# Patient Record
Sex: MALE | Race: WHITE | NOT HISPANIC OR LATINO | Employment: OTHER | URBAN - METROPOLITAN AREA
[De-identification: names, ages, dates, MRNs, and addresses within clinical notes are randomized per-mention and may not be internally consistent; named-entity substitution may affect disease eponyms.]

---

## 2018-02-15 PROBLEM — J18.9 PNEUMONIA OF BOTH LOWER LOBES DUE TO INFECTIOUS ORGANISM: Status: ACTIVE | Noted: 2018-02-15

## 2018-02-15 PROBLEM — R05.8 PRODUCTIVE COUGH: Status: ACTIVE | Noted: 2018-02-15

## 2018-02-17 PROBLEM — R55 SYNCOPE: Status: ACTIVE | Noted: 2018-02-17

## 2018-02-18 PROBLEM — I10 HYPERTENSION: Status: ACTIVE | Noted: 2018-02-18

## 2018-02-18 PROBLEM — R09.1 PLEURITIS: Status: ACTIVE | Noted: 2018-02-18

## 2018-02-18 PROBLEM — J32.9 SINUSITIS: Status: ACTIVE | Noted: 2018-02-18

## 2018-02-18 PROBLEM — E05.90 HYPERTHYROIDISM: Status: ACTIVE | Noted: 2018-02-18

## 2018-02-19 PROBLEM — K59.00 CONSTIPATION: Status: ACTIVE | Noted: 2018-02-19

## 2019-03-06 ENCOUNTER — OFFICE VISIT (OUTPATIENT)
Dept: URGENT CARE | Facility: CLINIC | Age: 59
End: 2019-03-06
Payer: COMMERCIAL

## 2019-03-06 VITALS
HEIGHT: 75 IN | SYSTOLIC BLOOD PRESSURE: 137 MMHG | BODY MASS INDEX: 37.3 KG/M2 | RESPIRATION RATE: 20 BRPM | OXYGEN SATURATION: 98 % | DIASTOLIC BLOOD PRESSURE: 89 MMHG | TEMPERATURE: 99 F | HEART RATE: 82 BPM | WEIGHT: 300 LBS

## 2019-03-06 DIAGNOSIS — R10.12 LEFT UPPER QUADRANT PAIN: Primary | ICD-10-CM

## 2019-03-06 DIAGNOSIS — M62.08 DIASTASIS RECTI: ICD-10-CM

## 2019-03-06 PROCEDURE — 3008F BODY MASS INDEX DOCD: CPT | Mod: CPTII,S$GLB,, | Performed by: PHYSICIAN ASSISTANT

## 2019-03-06 PROCEDURE — 99203 PR OFFICE/OUTPT VISIT, NEW, LEVL III, 30-44 MIN: ICD-10-PCS | Mod: S$GLB,,, | Performed by: PHYSICIAN ASSISTANT

## 2019-03-06 PROCEDURE — 3008F PR BODY MASS INDEX (BMI) DOCUMENTED: ICD-10-PCS | Mod: CPTII,S$GLB,, | Performed by: PHYSICIAN ASSISTANT

## 2019-03-06 PROCEDURE — 99203 OFFICE O/P NEW LOW 30 MIN: CPT | Mod: S$GLB,,, | Performed by: PHYSICIAN ASSISTANT

## 2019-03-06 NOTE — PROGRESS NOTES
"Subjective:       Patient ID: Buck Manrique is a 58 y.o. male.    Vitals:  height is 6' 3" (1.905 m) and weight is 136.1 kg (300 lb). His oral temperature is 98.7 °F (37.1 °C). His blood pressure is 137/89 and his pulse is 82. His respiration is 20 and oxygen saturation is 98%.     Chief Complaint: Breast Mass    Pain/discomfort/protrusion in LUQ since episode of pneumonia with heavy coughing approximately 1 year ago      Abdominal Pain   The current episode started more than 1 year ago. The problem occurs intermittently. The problem has been waxing and waning. The pain is located in the LUQ. The pain is at a severity of 5/10. The quality of the pain is aching. Pertinent negatives include no anorexia, constipation, diarrhea, dysuria, fever, frequency, hematochezia, hematuria, melena, nausea, vomiting or weight loss. The pain is aggravated by certain positions and coughing. The pain is relieved by certain positions.       Constitution: Negative for fever.   Neck: Negative for painful lymph nodes.   Cardiovascular: Negative for leg swelling.   Eyes: Negative for double vision and blurred vision.   Respiratory: Negative for shortness of breath.    Gastrointestinal: Positive for abdominal pain. Negative for nausea, vomiting, constipation, diarrhea and bright red blood in stool.   Genitourinary: Negative for dysuria, frequency, urgency and hematuria.   Musculoskeletal: Negative for muscle cramps.   Skin: Negative for color change and pale.   Allergic/Immunologic: Negative for seasonal allergies.   Neurological: Negative for dizziness, light-headedness and passing out.   Hematologic/Lymphatic: Negative for swollen lymph nodes, easy bruising/bleeding and history of blood clots. Does not bruise/bleed easily.   Psychiatric/Behavioral: Negative for nervous/anxious, sleep disturbance and depression. The patient is not nervous/anxious.        Objective:      Physical Exam   Constitutional: He is oriented to person, place, and " time. He appears well-developed and well-nourished.   HENT:   Head: Normocephalic and atraumatic.   Right Ear: External ear normal.   Left Ear: External ear normal.   Nose: Nose normal.   Mouth/Throat: Mucous membranes are normal.   Eyes: Conjunctivae and lids are normal.   Neck: Trachea normal and full passive range of motion without pain. Neck supple.   Cardiovascular: Normal rate, regular rhythm and normal heart sounds.   Pulmonary/Chest: Effort normal and breath sounds normal. No respiratory distress.   Abdominal: Soft. Normal appearance and bowel sounds are normal. He exhibits no shifting dullness, no distension, no abdominal bruit, no ascites, no pulsatile midline mass and no mass. There is no tenderness. There is no rigidity and no guarding.       Musculoskeletal: Normal range of motion. He exhibits no edema.   Neurological: He is alert and oriented to person, place, and time. He has normal strength.   Skin: Skin is warm, dry and intact. He is not diaphoretic. No pallor.   Psychiatric: He has a normal mood and affect. His speech is normal and behavior is normal. Judgment and thought content normal. Cognition and memory are normal.   Nursing note and vitals reviewed.      Assessment:       1. Left upper quadrant pain    2. Diastasis recti        Plan:       - Will discuss U/S results with patient when available, and possible gen surg referral. Warning signs and symptoms discussed that might warrant an ED visit. Pt v/u all education and instruction. Discharged in stable condition.    Left upper quadrant pain  -     US Abdomen Complete; Future; Expected date: 03/06/2019    Diastasis recti  -     US Abdomen Complete; Future; Expected date: 03/06/2019      Patient Instructions   · Follow up with your primary care in 2-5 days if symptoms have not improved, or you may return here.  · If you were referred to a specialist, please follow up with that specialty.  · If you were prescribed antibiotics, please take them to  completion.  · If you were prescribed a narcotic or any medication with sedative effects, do not drive or operate heavy equipment or machinery while taking these medications.  · You must understand that you have received treatment at an Urgent Care facility only, and that you may be released before all of your medical problems are known or treated. Urgent Care facilities are not equipped to handle life threatening emergencies. It is recommended that you go to an Emergency Department for further evaluation of worsening or concerning symptoms, or possibly life threatening conditions as discussed.                                        If you  smoke, please stop smoking

## 2019-03-08 ENCOUNTER — TELEPHONE (OUTPATIENT)
Dept: URGENT CARE | Facility: CLINIC | Age: 59
End: 2019-03-08

## 2019-05-22 ENCOUNTER — TELEPHONE (OUTPATIENT)
Dept: CARDIOTHORACIC SURGERY | Facility: CLINIC | Age: 59
End: 2019-05-22

## 2019-05-29 ENCOUNTER — TELEPHONE (OUTPATIENT)
Dept: CARDIOTHORACIC SURGERY | Facility: CLINIC | Age: 59
End: 2019-05-29

## 2019-05-29 NOTE — TELEPHONE ENCOUNTER
Left message for patient stating that images were received.    ----- Message from Meron Aranda sent at 5/29/2019  9:42 AM CDT -----  Contact: Pt.Self   Needs Advice    Reason for call:  Pt.states he would like to speak with nurse in reference to finding out if images were received by 's office         Communication Preference:  [phone] (please leave voicemail with nature of call)     Additional Information:    Thank You :)

## 2019-05-31 ENCOUNTER — OFFICE VISIT (OUTPATIENT)
Dept: CARDIOTHORACIC SURGERY | Facility: CLINIC | Age: 59
End: 2019-05-31
Payer: COMMERCIAL

## 2019-05-31 VITALS
HEIGHT: 75 IN | HEART RATE: 76 BPM | SYSTOLIC BLOOD PRESSURE: 133 MMHG | BODY MASS INDEX: 38.26 KG/M2 | OXYGEN SATURATION: 96 % | DIASTOLIC BLOOD PRESSURE: 84 MMHG | WEIGHT: 307.75 LBS

## 2019-05-31 DIAGNOSIS — S22.32XA CLOSED FRACTURE OF ONE RIB OF LEFT SIDE, INITIAL ENCOUNTER: ICD-10-CM

## 2019-05-31 DIAGNOSIS — E66.01 CLASS 3 SEVERE OBESITY DUE TO EXCESS CALORIES WITHOUT SERIOUS COMORBIDITY IN ADULT, UNSPECIFIED BMI: Primary | ICD-10-CM

## 2019-05-31 PROCEDURE — 3008F PR BODY MASS INDEX (BMI) DOCUMENTED: ICD-10-PCS | Mod: CPTII,S$GLB,, | Performed by: THORACIC SURGERY (CARDIOTHORACIC VASCULAR SURGERY)

## 2019-05-31 PROCEDURE — 99999 PR PBB SHADOW E&M-EST. PATIENT-LVL III: CPT | Mod: PBBFAC,,, | Performed by: THORACIC SURGERY (CARDIOTHORACIC VASCULAR SURGERY)

## 2019-05-31 PROCEDURE — 99204 PR OFFICE/OUTPT VISIT, NEW, LEVL IV, 45-59 MIN: ICD-10-PCS | Mod: S$GLB,,, | Performed by: THORACIC SURGERY (CARDIOTHORACIC VASCULAR SURGERY)

## 2019-05-31 PROCEDURE — 99204 OFFICE O/P NEW MOD 45 MIN: CPT | Mod: S$GLB,,, | Performed by: THORACIC SURGERY (CARDIOTHORACIC VASCULAR SURGERY)

## 2019-05-31 PROCEDURE — 99999 PR PBB SHADOW E&M-EST. PATIENT-LVL III: ICD-10-PCS | Mod: PBBFAC,,, | Performed by: THORACIC SURGERY (CARDIOTHORACIC VASCULAR SURGERY)

## 2019-05-31 PROCEDURE — 3008F BODY MASS INDEX DOCD: CPT | Mod: CPTII,S$GLB,, | Performed by: THORACIC SURGERY (CARDIOTHORACIC VASCULAR SURGERY)

## 2019-05-31 RX ORDER — ASPIRIN 325 MG
325 TABLET ORAL DAILY
COMMUNITY
End: 2021-12-22 | Stop reason: ALTCHOICE

## 2019-05-31 NOTE — PROGRESS NOTES
History & Physical    SUBJECTIVE:     History of Present Illness:   58 y.o. 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 Patient reports in February 2018 he had pneumonia and/or bronchitis requiring hospital admission. States he was coughing so forcefully he had several near syncopal episodes. Endorses a fall during syncopal episode but states he feel softly onto his bottom. During one coughing episode he felt an acute sharp pain in his left side. He assumed there was a pulled muscle but rib fracture was identified during hospital stay. Initially he did well but over the past 3-4 months pain has increased. Pain in sharp, shooting and worse in the morning. He wears an abdominal binder at work which does help with splinting. Denies clicking or popping. 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 He also reports LUQ pain and swelling. Recently seen in urgent care for persistent pain and chest CT was ordered. Identified a left 7th posterolateral rib fracture with evidence of nonunion.     Remote smoking history. Former heavy beer drinker but recently quit all alcohol in an effort to lose weight.   PSH: appendectomy, left foot reconstruction after injury     Chief Complaint   Patient presents with    Consult       Review of patient's allergies indicates:  No Known Allergies    Current Outpatient Medications   Medication Sig Dispense Refill    aspirin 325 MG tablet Take 325 mg by mouth once daily.      methIMAzole (TAPAZOLE) 10 MG Tab Take 10 mg by mouth once daily.       multivitamin capsule Take 1 capsule by mouth once daily.      olmesartan-hydrochlorothiazide (BENICAR HCT) 40-12.5 mg Tab Take 1 tablet by mouth once daily.      albuterol 90 mcg/actuation inhaler Inhale 1-2 puffs into the lungs every 6 (six) hours as needed for Wheezing. Rescue 1 Inhaler 0     No current facility-administered  "medications for this visit.        Past Medical History:   Diagnosis Date    Hypertension     Thyroid disease     HYPERTHYROID     Past Surgical History:   Procedure Laterality Date    APPENDECTOMY      LEFT FOOT SURGERY       Family History   Problem Relation Age of Onset    Diabetes Father     Hyperthyroidism Father      Social History     Tobacco Use    Smoking status: Former Smoker   Substance Use Topics    Alcohol use: No     Alcohol/week: 1.2 oz     Types: 2 Cans of beer per week     Frequency: Never    Drug use: No        Review of Systems:  Review of Systems   Constitutional: Negative for activity change, fatigue and fever.   HENT: Negative for congestion and trouble swallowing.    Eyes: Negative for visual disturbance.   Respiratory: Negative for cough, chest tightness, shortness of breath and wheezing.    Cardiovascular: Negative for chest pain, palpitations and leg swelling.   Gastrointestinal: Positive for abdominal distention and abdominal pain (LUQ). Negative for diarrhea, nausea and vomiting.   Genitourinary: Negative for difficulty urinating.   Musculoskeletal: Positive for arthralgias (Left chest). Negative for gait problem.   Neurological: Negative for weakness, light-headedness and headaches.   Psychiatric/Behavioral: Negative for confusion.       OBJECTIVE:     Vital Signs (Most Recent)  Vitals:    05/31/19 0912   BP: 133/84   Pulse: 76   SpO2: 96%   Weight: (!) 139.6 kg (307 lb 12.2 oz)   Height: 6' 3" (1.905 m)   PainSc: 0-No pain   Body mass index is 38.47 kg/m².    Physical Exam:  Physical Exam   Constitutional: He is oriented to person, place, and time. He appears well-developed and well-nourished.   Obese   HENT:   Head: Normocephalic.   Eyes: Pupils are equal, round, and reactive to light.   Neck: Normal range of motion. Neck supple. No tracheal deviation present.   Cardiovascular: Normal rate, regular rhythm, normal heart sounds and intact distal pulses.   Pulmonary/Chest: Effort " normal and breath sounds normal. He has no wheezes. He exhibits tenderness.   Left chest wall tenderness. No clicking on palpation. Difficult to identify rib spaces due to subcutaneous tissues.   Abdominal: Soft. Bowel sounds are normal. He exhibits no distension. There is no tenderness. A hernia (Umbilical hernia. No obvious LUQ herniation) is present.   Non tender, reducible umbilical hernia without discoloration.  Multiple venous varicosities across abdominal wall, abdominal diastasis   Musculoskeletal: He exhibits no edema.   Neurological: He is alert and oriented to person, place, and time.   Skin: Skin is warm.   Psychiatric: He has a normal mood and affect.       Chest and Abdomen CT 5/14/19:   Thyroid is unchanged with heterogeneous nodule at its inferior aspect containing dense internal calcification and measuring 2.9 cm. Lack of intravenous contrast limits evaluation of the vasculature and remaining mediastinal structures. Unenhanced aorta normal in caliber. No pulmonary artery enlargement. No cardiac chamber enlargement. No pericardial or pleural effusion. No adenopathy. No consolidation. No central endobronchial lesions. Old fracture left 7th rib laterally with evidence of nonunion. Findings within the imaged upper abdomen are reported on concomitant performed dedicated CT of the abdomen and pelvis. Nonobstructing calyceal calculus right kidney. Hepatic steatosis.    ASSESSMENT/PLAN:      58 y.o. 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  I suspect that there may be two different process at play here-an old rib fracture and possibly an upper abdominal wall muscle strain.      PLAN:Plan   Long discussion with patient and wife regarding options for surgical and non-surgical management of left rib fracture. We  discussed rib plating today.  There is a callus at the site of the left 5th rib fracture with no significant rib displacement.   I expressed  concerns about the patient's obesity and very protuberant abdomen.  If he has any left costal margin muscle strain or hernia, his large abdomen will negatively impact a successful repair. I will consider surgical intervention only after the patient has lost weight.  He drinks 1-2 cases of beer/ week and eats one large meal right before going to bed.  I discussed lifestyle and dietary changes with the patient and his wife and suggested that such changes may positively impact his symptom severity and increase the likelihood of a successful outcome if surgery is performed. I recommend no heavy lifting greater than 40 pounds at work for 8 weeks. Will repeat chest CT in 3 months to assess rib healing, left subcostal margin and epigastric discomfort and his progress with weight loss.       I also advised the patient to seek urgent medical attention if he were to develop periumbilical pain with or without redness or dark blue color

## 2019-05-31 NOTE — LETTER
May 31, 2019    Buck Manrique  [address] [facility] [address]         [facility]  [address]  [place] [address]  Phone: [phone]  Fax: [phone] May 31, 2019     Patient: Buck Manrique   YOB: 1960   Date of Visit: 5/31/2019

## 2019-05-31 NOTE — LETTER
[facility]  [address]  [place] [address]  Phone: [phone]  Fax: [phone] May 31, 2019      Oneal Montejo MD  [address] [place] [id number]    Patient: Buck Manrique   MR Number: [id number]   YOB: 1960   Date of Visit: 5/31/2019       Dear Dr. Montejo:    Thank you for referring Buck Manrique to me for evaluation. Mr. Manrique presents for evaluation of a left-sided rib fracture and possible costal margin herniation with epigastric pain.      I reviewed the chest CT images which reveal a left seventh rib fracture with callus formation, incomplete union and no rib displacement.  I do not see any obvious chest wall or upper abdomen deformity.  He does have an abdominal diastasis and an umbilical hernia as well as a markedly protuberant abdomen with prominent chest wall venous collaterals.    I do not recommend any surgical intervention until Mr. Manrique loses weight.  I will then reevaluate him to see if there is a need for rib plating.  I suspect that he may have two different processes-an old rib fracture and a possible abdominal strain.  I will see him in follow up in 3 months.     Sincerely,    Alfonso Allen MD    BP/etb    CC  Tonja Merino MD    [facility]

## 2019-07-02 DIAGNOSIS — E66.01 CLASS 3 SEVERE OBESITY DUE TO EXCESS CALORIES WITHOUT SERIOUS COMORBIDITY IN ADULT, UNSPECIFIED BMI: Primary | ICD-10-CM

## 2019-09-27 ENCOUNTER — OFFICE VISIT (OUTPATIENT)
Dept: URGENT CARE | Facility: CLINIC | Age: 59
End: 2019-09-27
Payer: COMMERCIAL

## 2019-09-27 VITALS
TEMPERATURE: 99 F | BODY MASS INDEX: 38.17 KG/M2 | WEIGHT: 307 LBS | HEIGHT: 75 IN | HEART RATE: 79 BPM | OXYGEN SATURATION: 96 % | DIASTOLIC BLOOD PRESSURE: 70 MMHG | SYSTOLIC BLOOD PRESSURE: 140 MMHG

## 2019-09-27 DIAGNOSIS — H11.31 SUBCONJUNCTIVAL HEMORRHAGE OF RIGHT EYE: Primary | ICD-10-CM

## 2019-09-27 PROCEDURE — 99214 PR OFFICE/OUTPT VISIT, EST, LEVL IV, 30-39 MIN: ICD-10-PCS | Mod: S$GLB,,, | Performed by: FAMILY MEDICINE

## 2019-09-27 PROCEDURE — 99214 OFFICE O/P EST MOD 30 MIN: CPT | Mod: S$GLB,,, | Performed by: FAMILY MEDICINE

## 2019-09-27 PROCEDURE — 3008F PR BODY MASS INDEX (BMI) DOCUMENTED: ICD-10-PCS | Mod: CPTII,S$GLB,, | Performed by: FAMILY MEDICINE

## 2019-09-27 PROCEDURE — 3008F BODY MASS INDEX DOCD: CPT | Mod: CPTII,S$GLB,, | Performed by: FAMILY MEDICINE

## 2019-09-27 NOTE — LETTER
September 27, 2019  Buck Manrique  [address]  [place] [address]                [facility] [place]  [address], [address]  [place] [address]  Phone: [phone]  Fax: [phone] Buck Manrique was seen and treated in our Urgent Care   department on 9/27/2019. He may return to work in 2 - 3 days.      If you have any questions or concerns, please don't hesitate to call.    Sincerely,        Ovi Devries MD

## 2019-09-28 ENCOUNTER — TELEPHONE (OUTPATIENT)
Dept: URGENT CARE | Facility: CLINIC | Age: 59
End: 2019-09-28

## 2019-09-28 NOTE — PROGRESS NOTES
"Subjective:       Patient ID: Buck Manrique is a 59 y.o. male.    Vitals:  height is 6' 3" (1.905 m) and weight is 139.3 kg (307 lb) (abnormal). His tympanic temperature is 98.6 °F (37 °C). His blood pressure is 140/70 (abnormal) and his pulse is 79. His oxygen saturation is 96%.     Chief Complaint: Eye Problem    Eye Problem    The right eye is affected. This is a new problem. The current episode started today. The problem occurs constantly. The problem has been gradually worsening. The injury mechanism is unknown. The patient is experiencing no pain. There is no known exposure to pink eye. He wears contacts. Associated symptoms include blurred vision, eye redness and itching. Pertinent negatives include no eye discharge, double vision, fever, foreign body sensation or photophobia. He has tried nothing for the symptoms.       Constitution: Negative for chills and fever.   HENT: Negative for congestion and sinus pain.    Eyes: Positive for eye itching, eye redness and blurred vision. Negative for eye trauma, foreign body in eye, eye discharge, eye pain, photophobia, vision loss, double vision and eyelid swelling.   Allergic/Immunologic: Negative for seasonal allergies and itching.       Objective:      Physical Exam   Constitutional: He is oriented to person, place, and time. He appears well-developed and well-nourished. He is cooperative.  Non-toxic appearance. He does not appear ill. No distress.   HENT:   Head: Normocephalic and atraumatic.   Right Ear: Hearing, tympanic membrane, external ear and ear canal normal.   Left Ear: Hearing, tympanic membrane, external ear and ear canal normal.   Nose: Nose normal. No mucosal edema, rhinorrhea or nasal deformity. No epistaxis. Right sinus exhibits no maxillary sinus tenderness and no frontal sinus tenderness. Left sinus exhibits no maxillary sinus tenderness and no frontal sinus tenderness.   Mouth/Throat: Uvula is midline, oropharynx is clear and moist and mucous " membranes are normal. No trismus in the jaw. Normal dentition. No uvula swelling. No posterior oropharyngeal erythema.   Eyes: Conjunctivae and lids are normal. Right eye exhibits no discharge. Left eye exhibits no discharge. No scleral icterus.   Fundoscopic exam:       The right eye shows hemorrhage.       Sclera clear bilat   Neck: Trachea normal, normal range of motion, full passive range of motion without pain and phonation normal. Neck supple.   Cardiovascular: Normal rate, regular rhythm, normal heart sounds, intact distal pulses and normal pulses.   Pulmonary/Chest: Effort normal and breath sounds normal. No respiratory distress.   Abdominal: Soft. Normal appearance and bowel sounds are normal. He exhibits no distension, no pulsatile midline mass and no mass. There is no tenderness.   Musculoskeletal: Normal range of motion. He exhibits no edema or deformity.   Neurological: He is alert and oriented to person, place, and time. He exhibits normal muscle tone. Coordination normal.   Skin: Skin is warm, dry and intact. He is not diaphoretic. No pallor.   Psychiatric: He has a normal mood and affect. His speech is normal and behavior is normal. Judgment and thought content normal. Cognition and memory are normal.   Nursing note and vitals reviewed.      Assessment:       1. Subconjunctival hemorrhage of right eye        Plan:         Subconjunctival hemorrhage of right eye  -     tobramycin-dexamethasone (TOBRADEX ST) 0.3-0.05 % DrpS; Apply 1 drop to eye 3 (three) times daily. for 10 days  Dispense: 5 mL; Refill: 0    Please drink plenty of fluids.  Please get plenty of rest.  Please return here or go to the Emergency Department for any concerns or worsening of condition.  If you were given wait & see antibiotics, please wait 3-5 days before taking them, and only take them if your symptoms have worsened or not improved.  If you do begin taking the antibiotics, please take them to completion.  If you were  prescribed antibiotics, please take them to completion.  If you were prescribed a narcotic medication, do not drive or operate heavy equipment or machinery while taking these medications.      If not allergic, please take over the counter Tylenol (Acetaminophen) and/or Motrin (Ibuprofen) as directed for control of pain and/or fever.    Please follow up with your primary care doctor or specialist as needed.  Tonja Merino MD  [phone]    [facility] - [facility]  [address] [place]  [address] [phone]    You must understand that you have received treatment at an Urgent Care facility only, and that you may be  released before all of your medical problems are known or treated. Urgent Care facilities are not equipped to  handle life threatening emergencies. It is recommended that you seek care at an Emergency Department for  further evaluation of worsening or concerning symptoms, or possibly life threatening conditions as  discussed.

## 2019-09-28 NOTE — PATIENT INSTRUCTIONS
Please drink plenty of fluids.  Please get plenty of rest.  Please return here or go to the Emergency Department for any concerns or worsening of condition.  If you were given wait & see antibiotics, please wait 3-5 days before taking them, and only take them if your symptoms have worsened or not improved.  If you do begin taking the antibiotics, please take them to completion.  If you were prescribed antibiotics, please take them to completion.  If you were prescribed a narcotic medication, do not drive or operate heavy equipment or machinery while taking these medications.      If not allergic, please take over the counter Tylenol (Acetaminophen) and/or Motrin (Ibuprofen) as directed for control of pain and/or fever.    Please follow up with your primary care doctor or specialist as needed.  Tonja Merino MD  [phone]    Ophthalmology - [facility]  [address] [place]  [address]  [phone]    You must understand that you have received treatment at an Urgent Care facility only, and that you may be  released before all of your medical problems are known or treated. Urgent Care facilities are not equipped to  handle life threatening emergencies. It is recommended that you seek care at an Emergency Department for  further evaluation of worsening or concerning symptoms, or possibly life threatening conditions as  discussed.      Subconjunctival Hemorrhage    A subconjunctival hemorrhage is a result of a broken blood vessel in the white portion of the eye. It is usually painless and may be caused by coughing, sneezing, or vomiting. An injury to the eye can cause this. It can also be a sign of hypertension (high blood pressure) or a bleeding disorder.  Although it can look frightening, the presence of the blood is not serious. The blood will be reabsorbed without treatment within 2 to 3 weeks.  Home care  You may continue your usual activities.  Follow-up care  Follow up with  your healthcare provider, or as advised.  When to seek medical advice  Contact your healthcare provider right away if any of these occur:  · Pain in the eye  · Change in vision  · The blood does not disappear within three weeks  · Increasing redness or swelling of the eye  · Severe headache or dizziness  · Signs of bruising or bleeding from other parts of your body  Date Last Reviewed: 6/14/2015  © 1262-5003 [employer]. [address] [place] [address], [place] [address]. All rights reserved. This information is not intended as a substitute for professional medical care. Always follow your healthcare professional's instructions.      Subconjunctival Hemorrhage    A subconjunctival hemorrhage is when a blood vessel breaks open in the white of the eye. It causes a bright red patch in the white of the eye. It is similar to a bruise on the skin. This type of hemorrhage is common. It can look quite alarming, but it is usually harmless.  Understanding the conjunctiva  The conjunctiva is the thin layer that covers the inside of the eyelids and the surface of the eye. It has many tiny blood vessels that bring oxygen and nutrients to the eye. The sclera is the white part of the eye that lies beneath the conjunctiva. Sometimes a blood vessel in the conjunctiva breaks open and bleeds. The blood then collects under the conjunctiva and turns part of the eye red. Over several weeks, your body then absorbs the blood.  What causes subconjunctival hemorrhage?  In many cases the cause isnt known. But some health conditions may make it more likely. These include:  · Eye injury  · Eye surgery  · High blood pressure  · Inflammation of the conjunctiva  · Contact lens use  · Diabetes  · Arteriosclerosis  · Tumor of the conjunctiva  · Diseases that affect blood clotting  · Violent sneezing, coughing, or vomiting  · Certain medicines that can increase bleeding, such as aspirin  · Pushing hard during childbirth  · Straining during  constipation  Symptoms of subconjunctival hemorrhage  The main symptom is a red patch on the eye. You may notice it after waking up in the morning. In most cases just one eye will have a hemorrhage. It usually happens once and then goes away. But some health conditions may cause repeat hemorrhages. You may feel like you have something in your eye, but this is not common. The hemorrhage shouldnt affect your eyesight or cause any pain. If you do have pain, you may have another type of problem with your eye.  Diagnosing subconjunctival hemorrhage  Your healthcare provider will ask about your health history. You may have a physical exam. This includes a basic eye exam. Your provider will make sure you dont have other causes of red eye that may need other treatment.  You will need to see an eye doctor (ophthalmologist) if you have had an eye injury. This doctor might use a special lighted microscope to look closely at your eye. This helps show the doctor if the injury hurt the eye itself and not just its outer layer.  If this is not your first subconjunctival hemorrhage, your doctor may need to find the cause. For example, you may need blood tests to check for a blood clotting disorder.  Treatment for subconjunctival hemorrhage  In most cases you will not need treatment. The red patch will usually go away on its own in a few weeks. It will turn from red to brown and then yellow. There are no treatments to speed up this process. Your doctor may suggest you use a warm compress and artificial tears eye drops to help relieve some of the redness.  If your subconjunctival hemorrhage was caused by a health condition, that condition will be treated. For example, you may need a blood pressure medicine to treat high blood pressure.  When to call your healthcare provider  Call your healthcare provider right away if you have any of these:  · Hemorrhage that doesnt go away in 2 to 3 weeks  · Eye pain  · Loss of eyesight  · Another  subconjunctival hemorrhage    Date Last Reviewed: 2/1/2017  © 4538-5186 [employer]. [address] [place] [address], [place] [address]. All rights reserved. This information is not intended as a substitute for professional medical care. Always follow your healthcare professional's instructions.

## 2020-08-20 DIAGNOSIS — U07.1 COVID-19 VIRUS DETECTED: ICD-10-CM

## 2021-02-27 PROBLEM — R06.09 DYSPNEA ON EXERTION: Status: ACTIVE | Noted: 2021-02-27

## 2021-05-04 ENCOUNTER — PATIENT MESSAGE (OUTPATIENT)
Dept: RESEARCH | Facility: HOSPITAL | Age: 61
End: 2021-05-04

## 2021-08-07 PROBLEM — Z86.16 HISTORY OF 2019 NOVEL CORONAVIRUS DISEASE (COVID-19): Status: ACTIVE | Noted: 2020-08-03

## 2021-08-07 PROBLEM — R73.03 PREDIABETES: Status: ACTIVE | Noted: 2020-11-30

## 2021-12-21 PROBLEM — J12.82 PNEUMONIA DUE TO COVID-19 VIRUS: Status: ACTIVE | Noted: 2018-02-15

## 2021-12-21 PROBLEM — U07.1 PNEUMONIA DUE TO COVID-19 VIRUS: Status: ACTIVE | Noted: 2018-02-15

## 2021-12-21 PROBLEM — R55 SYNCOPE: Status: RESOLVED | Noted: 2018-02-17 | Resolved: 2021-12-21

## 2021-12-21 PROBLEM — R09.1 PLEURITIS: Status: RESOLVED | Noted: 2018-02-18 | Resolved: 2021-12-21

## 2021-12-21 PROBLEM — J32.9 SINUSITIS: Status: RESOLVED | Noted: 2018-02-18 | Resolved: 2021-12-21

## 2021-12-21 PROBLEM — R05.8 PRODUCTIVE COUGH: Status: RESOLVED | Noted: 2018-02-15 | Resolved: 2021-12-21

## 2021-12-21 PROBLEM — R06.09 DYSPNEA ON EXERTION: Status: RESOLVED | Noted: 2021-02-27 | Resolved: 2021-12-21

## 2021-12-21 PROBLEM — J12.82 PNEUMONIA DUE TO COVID-19 VIRUS: Status: RESOLVED | Noted: 2018-02-15 | Resolved: 2021-12-21

## 2021-12-21 PROBLEM — U07.1 PNEUMONIA DUE TO COVID-19 VIRUS: Status: RESOLVED | Noted: 2018-02-15 | Resolved: 2021-12-21

## 2021-12-22 PROBLEM — F42.2 MIXED OBSESSIONAL THOUGHTS AND ACTS: Status: ACTIVE | Noted: 2021-12-22

## 2021-12-22 PROBLEM — Z87.81 HISTORY OF FRACTURE OF RIB: Status: ACTIVE | Noted: 2018-04-01

## 2022-01-01 PROBLEM — E11.9 TYPE 2 DIABETES MELLITUS WITHOUT COMPLICATION, WITHOUT LONG-TERM CURRENT USE OF INSULIN: Status: ACTIVE | Noted: 2021-12-27

## 2022-06-29 PROBLEM — Z87.81 HISTORY OF FRACTURE OF RIB: Status: RESOLVED | Noted: 2018-04-01 | Resolved: 2022-06-29

## 2022-06-29 PROBLEM — E88.810 METABOLIC SYNDROME: Status: ACTIVE | Noted: 2022-06-29

## 2022-06-29 PROBLEM — Z86.16 HISTORY OF 2019 NOVEL CORONAVIRUS DISEASE (COVID-19): Status: RESOLVED | Noted: 2020-08-03 | Resolved: 2022-06-29

## 2022-06-29 PROBLEM — R73.03 PREDIABETES: Status: RESOLVED | Noted: 2020-11-30 | Resolved: 2022-06-29

## 2022-06-29 PROBLEM — R79.89 LOW TESTOSTERONE IN MALE: Status: ACTIVE | Noted: 2022-06-29

## 2022-07-07 PROBLEM — K76.0 NAFL (NONALCOHOLIC FATTY LIVER): Status: ACTIVE | Noted: 2022-07-07

## 2022-09-30 PROBLEM — K21.01 GASTROESOPHAGEAL REFLUX DISEASE WITH ESOPHAGITIS AND HEMORRHAGE: Status: ACTIVE | Noted: 2022-09-30

## 2022-11-22 PROBLEM — M75.122 COMPLETE ROTATOR CUFF TEAR OF LEFT SHOULDER: Status: ACTIVE | Noted: 2022-11-22

## 2022-12-02 PROBLEM — Z86.19 HISTORY OF EPSTEIN-BARR VIRUS INFECTION: Status: ACTIVE | Noted: 2022-12-02

## 2022-12-30 PROBLEM — K21.00 GASTROESOPHAGEAL REFLUX DISEASE WITH ESOPHAGITIS WITHOUT HEMORRHAGE: Status: ACTIVE | Noted: 2022-09-30

## 2022-12-30 PROBLEM — I87.2 VENOUS INSUFFICIENCY OF BOTH LOWER EXTREMITIES: Status: ACTIVE | Noted: 2022-12-30

## 2023-03-19 PROBLEM — R93.1 AGATSTON CORONARY ARTERY CALCIUM SCORE BETWEEN 100 AND 199: Status: ACTIVE | Noted: 2023-03-19

## 2023-04-05 PROBLEM — Z98.890 S/P ARTHROSCOPY OF LEFT SHOULDER: Status: ACTIVE | Noted: 2023-04-05

## 2023-04-05 PROBLEM — E66.01 SEVERE OBESITY (BMI 35.0-39.9) WITH COMORBIDITY: Status: ACTIVE | Noted: 2023-04-05

## 2023-04-05 PROBLEM — M75.122 COMPLETE ROTATOR CUFF TEAR OF LEFT SHOULDER: Status: RESOLVED | Noted: 2022-11-22 | Resolved: 2023-04-05

## 2023-05-09 PROBLEM — N20.0 BILATERAL KIDNEY STONES: Status: ACTIVE | Noted: 2023-05-09

## 2023-08-07 PROBLEM — E11.9 TYPE 2 DIABETES MELLITUS WITHOUT COMPLICATION, WITHOUT LONG-TERM CURRENT USE OF INSULIN: Chronic | Status: ACTIVE | Noted: 2021-12-27

## 2023-08-11 PROBLEM — N12 PYELONEPHRITIS OF LEFT KIDNEY: Status: ACTIVE | Noted: 2023-08-11

## 2023-08-11 PROBLEM — A41.9 SEPSIS: Status: ACTIVE | Noted: 2023-08-11

## 2023-08-11 PROBLEM — Z96.0 URETERAL STENT PRESENT: Status: ACTIVE | Noted: 2023-08-11

## 2023-08-12 PROBLEM — R65.20 SEVERE SEPSIS: Status: ACTIVE | Noted: 2023-08-11

## 2023-08-12 PROBLEM — N17.9 AKI (ACUTE KIDNEY INJURY): Status: ACTIVE | Noted: 2023-08-12

## 2023-08-16 PROBLEM — R78.81 BACTEREMIA DUE TO ENTEROCOCCUS: Status: ACTIVE | Noted: 2023-08-16

## 2023-08-16 PROBLEM — B95.2 BACTEREMIA DUE TO ENTEROCOCCUS: Status: ACTIVE | Noted: 2023-08-16

## 2023-08-31 PROBLEM — N20.0 CALCULUS OF KIDNEY: Status: ACTIVE | Noted: 2023-08-31

## 2023-08-31 PROBLEM — Z46.6 FITTING AND ADJUSTMENT OF URINARY DEVICE: Status: ACTIVE | Noted: 2023-08-31

## 2023-09-12 PROBLEM — Z46.6 FITTING AND ADJUSTMENT OF URINARY DEVICE: Status: RESOLVED | Noted: 2023-08-31 | Resolved: 2023-09-12

## 2023-11-13 PROBLEM — N12 PYELONEPHRITIS OF LEFT KIDNEY: Status: RESOLVED | Noted: 2023-08-11 | Resolved: 2023-11-13

## 2023-11-13 PROBLEM — R65.20 SEVERE SEPSIS: Status: RESOLVED | Noted: 2023-08-11 | Resolved: 2023-11-13

## 2023-11-13 PROBLEM — A41.9 SEVERE SEPSIS: Status: RESOLVED | Noted: 2023-08-11 | Resolved: 2023-11-13

## 2023-11-13 PROBLEM — N17.9 AKI (ACUTE KIDNEY INJURY): Status: RESOLVED | Noted: 2023-08-12 | Resolved: 2023-11-13

## 2023-12-15 PROBLEM — N20.0 CALCULUS OF KIDNEY: Status: RESOLVED | Noted: 2023-08-31 | Resolved: 2023-12-15

## 2023-12-15 PROBLEM — Z96.0 URETERAL STENT PRESENT: Status: RESOLVED | Noted: 2023-08-11 | Resolved: 2023-12-15

## 2023-12-15 PROBLEM — B95.2 BACTEREMIA DUE TO ENTEROCOCCUS: Status: RESOLVED | Noted: 2023-08-16 | Resolved: 2023-12-15

## 2023-12-15 PROBLEM — R78.81 BACTEREMIA DUE TO ENTEROCOCCUS: Status: RESOLVED | Noted: 2023-08-16 | Resolved: 2023-12-15

## 2024-03-22 PROBLEM — K21.00 GASTROESOPHAGEAL REFLUX DISEASE WITH ESOPHAGITIS WITHOUT HEMORRHAGE: Chronic | Status: ACTIVE | Noted: 2022-09-30

## 2024-03-22 PROBLEM — F42.2 MIXED OBSESSIONAL THOUGHTS AND ACTS: Chronic | Status: ACTIVE | Noted: 2021-12-22

## 2024-03-22 PROBLEM — R93.1 AGATSTON CORONARY ARTERY CALCIUM SCORE BETWEEN 100 AND 199: Chronic | Status: ACTIVE | Noted: 2023-03-19

## 2024-03-22 PROBLEM — I10 PRIMARY HYPERTENSION: Chronic | Status: ACTIVE | Noted: 2018-02-18

## 2024-03-22 PROBLEM — R79.89 LOW TESTOSTERONE IN MALE: Chronic | Status: ACTIVE | Noted: 2022-06-29

## 2024-03-22 PROBLEM — K76.0 NAFL (NONALCOHOLIC FATTY LIVER): Chronic | Status: ACTIVE | Noted: 2022-07-07

## 2024-03-22 PROBLEM — E88.810 METABOLIC SYNDROME: Chronic | Status: ACTIVE | Noted: 2022-06-29

## 2024-03-22 PROBLEM — E66.01 SEVERE OBESITY (BMI 35.0-39.9) WITH COMORBIDITY: Chronic | Status: ACTIVE | Noted: 2023-04-05

## 2024-03-22 PROBLEM — E05.90 HYPERTHYROIDISM: Chronic | Status: ACTIVE | Noted: 2018-02-18

## 2024-03-22 PROBLEM — Z86.19 HISTORY OF EPSTEIN-BARR VIRUS INFECTION: Chronic | Status: ACTIVE | Noted: 2022-12-02

## 2024-03-22 PROBLEM — I87.2 VENOUS INSUFFICIENCY OF BOTH LOWER EXTREMITIES: Chronic | Status: ACTIVE | Noted: 2022-12-30

## 2024-12-16 NOTE — TELEPHONE ENCOUNTER
Patient notified of the scheduled appointment with Dr. Allen for May 31, 2019.  Reminder mailed.  ----- Message from Judi Guzmán sent at 5/22/2019  2:10 PM CDT -----  Contact: Patient   Needs Advice    Reason for call: Patient states that he is needing a consult for a rib reattachment. Patient states he broke his 7th rib when he had pneumonia and it will not re attach        Communication Preference: [phone]     Additional Information: please contact the patient to accommodate him with an appointment         problems. It's also a good idea to know your test results and keep a list of the medicines you take.  How can you care for yourself at home?  Diet    Use less salt when you cook and eat. This helps lower your blood pressure. Taste food before salting. Add only a little salt when you think you need it. With time, your taste buds will adjust to less salt.     Eat fewer snack items, fast foods, canned soups, and other high-salt, high-fat, processed foods.     Read food labels and try to avoid saturated and trans fats. They increase your risk of heart disease by raising cholesterol levels.     Limit the amount of solid fat--butter, margarine, and shortening--you eat. Use olive, peanut, or canola oil when you cook. Bake, broil, and steam foods instead of frying them.     Eat a variety of fruit and vegetables every day. Dark green, deep orange, red, or yellow fruits and vegetables are especially good for you. Examples include spinach, carrots, peaches, and berries.     Foods high in fiber can reduce your cholesterol and provide important vitamins and minerals. High-fiber foods include whole-grain cereals and breads, oatmeal, beans, brown rice, citrus fruits, and apples.     Eat lean proteins. Heart-healthy proteins include seafood, lean meats and poultry, eggs, beans, peas, nuts, seeds, and soy products.     Limit drinks and foods with added sugar. These include candy, desserts, and soda pop.   Heart-healthy lifestyle    If your doctor recommends it, get more exercise. For many people, walking is a good choice. Or you may want to swim, bike, or do other activities. Bit by bit, increase the time you're active every day. Try for at least 30 minutes on most days of the week.     Try to quit or cut back on using tobacco and other nicotine products. This includes smoking and vaping. If you need help quitting, talk to your doctor about stop-smoking programs and medicines. These can increase your chances of quitting for good.